# Patient Record
Sex: FEMALE | Race: WHITE | NOT HISPANIC OR LATINO | Employment: FULL TIME | URBAN - METROPOLITAN AREA
[De-identification: names, ages, dates, MRNs, and addresses within clinical notes are randomized per-mention and may not be internally consistent; named-entity substitution may affect disease eponyms.]

---

## 2020-01-07 ENCOUNTER — TREATMENT (OUTPATIENT)
Dept: PHYSICAL THERAPY | Facility: CLINIC | Age: 60
End: 2020-01-07

## 2020-01-07 ENCOUNTER — TRANSCRIBE ORDERS (OUTPATIENT)
Dept: PHYSICAL THERAPY | Facility: CLINIC | Age: 60
End: 2020-01-07

## 2020-01-07 DIAGNOSIS — M54.6 THORACIC BACK PAIN, UNSPECIFIED BACK PAIN LATERALITY, UNSPECIFIED CHRONICITY: ICD-10-CM

## 2020-01-07 DIAGNOSIS — M54.50 LUMBAR PAIN: Primary | ICD-10-CM

## 2020-01-07 DIAGNOSIS — M54.50 LOW BACK PAIN, UNSPECIFIED BACK PAIN LATERALITY, UNSPECIFIED CHRONICITY, UNSPECIFIED WHETHER SCIATICA PRESENT: Primary | ICD-10-CM

## 2020-01-07 PROCEDURE — 97162 PT EVAL MOD COMPLEX 30 MIN: CPT | Performed by: PHYSICAL THERAPIST

## 2020-01-07 NOTE — PROGRESS NOTES
Physical Therapy Initial Evaluation and Plan of Care    Patient: Cara Lloyd   : 1960  Diagnosis/ICD-10 Code:  Low back pain, unspecified back pain laterality, unspecified chronicity, unspecified whether sciatica present [M54.5]  Referring practitioner: Rafael Salgado MD  Date of Initial Visit: 2020  Today's Date: 2020  Patient seen for 1 sessions             Subjective Evaluation    History of Present Illness  Date of onset: 2019  Mechanism of injury: Pt states that she was lifting a box of cardboard paper and she felt some pain in her low back after she lifted she noticed some pain in her low back. For a couple weeks she had some significant pain in the ow back and then it seemed to get progressively better. She still has some pain in the low back and she feels a little more unsure about lifting because of her back. Pt reports pain in the center of her mid to low back. She denies any numbness, tingling, or shooting pain into the legs.       Patient Occupation: Pt is a . prolonged walking, picking up products for shipping. She can limit some lifting at work.    Precautions and Work Restrictions: 15# lifting restriction Quality of life: good    Pain  Current pain ratin  At best pain ratin  At worst pain ratin  Location: midline low and middle back  Quality: dull ache  Relieving factors: rest, ice and medications (ibuprofen)  Aggravating factors: lifting  Progression: improved    Diagnostic Tests  No diagnostic tests performed    Treatments  No previous or current treatments  Patient Goals  Patient goals for therapy: decreased pain, increased motion, increased strength and return to work             Objective       Palpation     Additional Palpation Details  TTP noted about the right>Left SI joint. Hypertonicity and tenderness noted about the right pisiformis and glut med     Active Range of Motion     Lumbar   Flexion: Active lumbar flexion: 75%   Extension:  Active lumbar extension: 50%     Tests       Thoracic   Negative slump.     Lumbar     Left   Negative passive SLR.     Right   Negative passive SLR.     Right Pelvic Girdle/Sacrum   Positive: sacral spring.     Additional Tests Details  Supine leg length test + for anterior rotation right innominate     Ambulation     Ambulation: Level Surfaces     Additional Level Surfaces Ambulation Details  Mild weight shift to the left noted when walking          Assessment & Plan     Assessment  Impairments: abnormal muscle tone, abnormal or restricted ROM, activity intolerance, impaired physical strength, lacks appropriate home exercise program and pain with function  Assessment details: Patient is a 59 year old female who comes to physical therapy with c/o pain in the low back and right hip. Signs and symptoms are consistent with right SI joint dysfunction, lumbar strain, and associated muscle guarding resulting in pain, decreased ROM, decreased strength, and inability to perform all essential functional activities. Pt will benefit from skilled PT services to address the above issues.     Prognosis details:   SHORT TERM GOALS:     2 weeks  1. Pt independent with HEP  2. Pt to demonstrate trunk AROM 50-75% of expected norms to allow for improved ability to perform ADL's  3. Pt to demonstrate bilateral hip strength 4/5 in all planes to improved stability of the core/trunk     LONG TERM GOALS:   6 weeks  1. Pt to demonstrate trunk AROM % of expected norms to allow for improved ability to perform functional activities  2. Pt to demonstrate ability to perform full functional squat with good form and without increased pain in the low back   3. Pt to report being able to work full shift or work in the home without increase in pain in the back    Functional Limitations: carrying objects, lifting, pulling, pushing, uncomfortable because of pain, sitting and unable to perform repetitive tasks  Plan  Therapy options: will be seen  for skilled physical therapy services  Planned modality interventions: cryotherapy, high voltage pulsed current (pain management), iontophoresis, microcurrent electrical stimulation, TENS, thermotherapy (hydrocollator packs), ultrasound and traction  Planned therapy interventions: ADL retraining, body mechanics training, flexibility, functional ROM exercises, home exercise program, IADL retraining, joint mobilization, manual therapy, motor coordination training, neuromuscular re-education, postural training, soft tissue mobilization, spinal/joint mobilization, strengthening, stretching and abdominal trunk stabilization  Frequency: 2x week  Duration in weeks: 6  Treatment plan discussed with: patient        Evaluation Only     PT SIGNATURE: Audie Elizondo, PT, DPT, OCS, Cert. DN   DATE TREATMENT INITIATED: 1/7/2020    Initial Certification  Certification Period: 4/6/2020  I certify that the therapy services are furnished while this patient is under my care.  The services outlined above are required by this patient, and will be reviewed every 90 days.     PHYSICIAN: Rafael Salgado MD      DATE:     Please sign and return via fax to [phone].. Thank you, [facility].

## 2023-06-02 PROCEDURE — 87070 CULTURE OTHR SPECIMN AEROBIC: CPT | Performed by: NURSE PRACTITIONER

## 2023-06-02 PROCEDURE — 87205 SMEAR GRAM STAIN: CPT | Performed by: NURSE PRACTITIONER
